# Patient Record
Sex: MALE | Race: WHITE | NOT HISPANIC OR LATINO | URBAN - METROPOLITAN AREA
[De-identification: names, ages, dates, MRNs, and addresses within clinical notes are randomized per-mention and may not be internally consistent; named-entity substitution may affect disease eponyms.]

---

## 2024-05-11 ENCOUNTER — OFFICE VISIT (OUTPATIENT)
Dept: URGENT CARE | Facility: CLINIC | Age: 69
End: 2024-05-11
Payer: COMMERCIAL

## 2024-05-11 VITALS
HEART RATE: 65 BPM | TEMPERATURE: 97.8 F | DIASTOLIC BLOOD PRESSURE: 80 MMHG | SYSTOLIC BLOOD PRESSURE: 143 MMHG | OXYGEN SATURATION: 94 % | RESPIRATION RATE: 18 BRPM

## 2024-05-11 DIAGNOSIS — H60.311 ACUTE DIFFUSE OTITIS EXTERNA OF RIGHT EAR: Primary | ICD-10-CM

## 2024-05-11 DIAGNOSIS — H61.21 IMPACTED CERUMEN OF RIGHT EAR: ICD-10-CM

## 2024-05-11 PROCEDURE — 1159F MED LIST DOCD IN RCRD: CPT | Performed by: PHYSICIAN ASSISTANT

## 2024-05-11 PROCEDURE — 99213 OFFICE O/P EST LOW 20 MIN: CPT | Performed by: PHYSICIAN ASSISTANT

## 2024-05-11 PROCEDURE — 1125F AMNT PAIN NOTED PAIN PRSNT: CPT | Performed by: PHYSICIAN ASSISTANT

## 2024-05-11 RX ORDER — OFLOXACIN 3 MG/ML
5 SOLUTION AURICULAR (OTIC) DAILY
Qty: 5 ML | Refills: 0 | Status: SHIPPED | OUTPATIENT
Start: 2024-05-11 | End: 2024-05-21

## 2024-05-11 RX ORDER — AMOXICILLIN 875 MG/1
875 TABLET, FILM COATED ORAL 2 TIMES DAILY
Qty: 20 TABLET | Refills: 0 | Status: SHIPPED | OUTPATIENT
Start: 2024-05-11 | End: 2024-05-21

## 2024-05-11 RX ORDER — METOPROLOL SUCCINATE 50 MG/1
50 TABLET, EXTENDED RELEASE ORAL
COMMUNITY
Start: 2024-01-24

## 2024-05-11 RX ORDER — CLOPIDOGREL BISULFATE 75 MG/1
75 TABLET ORAL
COMMUNITY
Start: 2024-01-24

## 2024-05-11 ASSESSMENT — PAIN SCALES - GENERAL: PAINLEVEL: 8

## 2024-05-11 NOTE — PROGRESS NOTES
Subjective   Patient ID: Brendan Rao is a 68 y.o. male who presents for Earache (Right ear only ).  Patient has a history of ear infections in the past as well as cerumen impactions.  He denies any fevers or chills or upper respiratory symptoms of nasal congestion or cough.  Denies any chest pain or shortness of breath nausea vomiting diarrhea or abdominal pain      The remainder of the systems were reviewed and are negative unless noted above      Objective   /80 (BP Location: Left arm, Patient Position: Sitting, BP Cuff Size: Large adult)   Pulse 65   Temp 36.6 °C (97.8 °F)   Resp 18   SpO2 94%   Physical Exam  Constitutional:       General: He is not in acute distress.     Appearance: Normal appearance. He is not ill-appearing, toxic-appearing or diaphoretic.   HENT:      Head: Normocephalic and atraumatic.      Right Ear: There is impacted cerumen.      Left Ear: Tympanic membrane normal. There is no impacted cerumen.      Ears:      Comments: The right sided EAC is erythematous with tragal tenderness and impacted cerumen     Nose: No congestion or rhinorrhea.      Mouth/Throat:      Mouth: Mucous membranes are moist.      Pharynx: Oropharynx is clear.   Eyes:      Conjunctiva/sclera: Conjunctivae normal.   Cardiovascular:      Rate and Rhythm: Normal rate and regular rhythm.      Heart sounds: No murmur heard.  Pulmonary:      Effort: Pulmonary effort is normal. No respiratory distress.      Breath sounds: Normal breath sounds. No wheezing.   Musculoskeletal:         General: No swelling, tenderness, deformity or signs of injury. Normal range of motion.      Cervical back: Normal range of motion and neck supple.   Skin:     General: Skin is warm and dry.      Findings: No erythema or rash.   Neurological:      General: No focal deficit present.      Mental Status: He is alert and oriented to person, place, and time.      Gait: Gait normal.         Assessment/Plan   Problem List Items Addressed This  Visit       Acute diffuse otitis externa of right ear - Primary    Relevant Medications    ofloxacin (Floxin) 0.3 % otic solution    amoxicillin (Amoxil) 875 mg tablet    Impacted cerumen of right ear      Cerumen impaction removed via irrigation and instrumentation today personally by myself.  Still small amount of cerumen present in the external auditory canal but with the tenderness of the canal and patient reporting improvement in his hearing no further instrumentation is necessary.  The right sided TM is visualized and is mildly erythematous.  The external auditory canal edematous and erythematous.  I am sending ofloxacin drops as well as amoxicillin to the patient's pharmacy

## 2024-05-11 NOTE — PATIENT INSTRUCTIONS
Assessment/Plan   Problem List Items Addressed This Visit       Acute diffuse otitis externa of right ear - Primary    Relevant Medications    ofloxacin (Floxin) 0.3 % otic solution    amoxicillin (Amoxil) 875 mg tablet    Impacted cerumen of right ear      Cerumen impaction removed via irrigation and instrumentation today personally by myself.  Still small amount of cerumen present in the external auditory canal but with the tenderness of the canal and patient reporting improvement in his hearing no further instrumentation is necessary.  The right sided TM is visualized and is mildly erythematous.  The external auditory canal edematous and erythematous.  I am sending ofloxacin drops as well as amoxicillin to the patient's pharmacy